# Patient Record
Sex: FEMALE | URBAN - METROPOLITAN AREA
[De-identification: names, ages, dates, MRNs, and addresses within clinical notes are randomized per-mention and may not be internally consistent; named-entity substitution may affect disease eponyms.]

---

## 2021-05-04 ENCOUNTER — OFFICE VISIT (OUTPATIENT)
Dept: URBAN - METROPOLITAN AREA CLINIC 58 | Facility: CLINIC | Age: 71
End: 2021-05-04
Payer: MEDICARE

## 2021-05-04 DIAGNOSIS — H25.13 AGE-RELATED NUCLEAR CATARACT, BILATERAL: Primary | ICD-10-CM

## 2021-05-04 DIAGNOSIS — H52.13 MYOPIA, BILATERAL: ICD-10-CM

## 2021-05-04 PROCEDURE — 92004 COMPRE OPH EXAM NEW PT 1/>: CPT | Performed by: OPTOMETRIST

## 2021-05-04 ASSESSMENT — INTRAOCULAR PRESSURE
OD: 16
OS: 16

## 2021-05-04 ASSESSMENT — VISUAL ACUITY
OD: 20/25
OS: 20/25

## 2021-05-04 NOTE — IMPRESSION/PLAN
Impression: Age-related nuclear cataract, bilateral: H25.13. Plan: Discussed diagnosis in detail with patient. No treatment is required at this time. Will continue to observe condition and or symptoms. Call if [address] worsens.

## 2024-04-04 ENCOUNTER — OFFICE VISIT (OUTPATIENT)
Dept: URBAN - METROPOLITAN AREA CLINIC 58 | Facility: CLINIC | Age: 74
End: 2024-04-04
Payer: MEDICARE

## 2024-04-04 DIAGNOSIS — H52.13 MYOPIA, BILATERAL: ICD-10-CM

## 2024-04-04 DIAGNOSIS — H25.13 AGE-RELATED NUCLEAR CATARACT, BILATERAL: Primary | ICD-10-CM

## 2024-04-04 PROCEDURE — 92012 INTRM OPH EXAM EST PATIENT: CPT | Performed by: OPTOMETRIST

## 2024-04-04 ASSESSMENT — KERATOMETRY
OD: 43.88
OS: 44.50

## 2024-04-04 ASSESSMENT — INTRAOCULAR PRESSURE
OD: 18
OS: 18

## 2024-04-04 ASSESSMENT — VISUAL ACUITY
OS: 20/25
OD: 20/25